# Patient Record
Sex: MALE | Race: OTHER | HISPANIC OR LATINO | Employment: FULL TIME | URBAN - METROPOLITAN AREA
[De-identification: names, ages, dates, MRNs, and addresses within clinical notes are randomized per-mention and may not be internally consistent; named-entity substitution may affect disease eponyms.]

---

## 2018-12-27 ENCOUNTER — APPOINTMENT (EMERGENCY)
Dept: RADIOLOGY | Facility: HOSPITAL | Age: 58
End: 2018-12-27
Payer: COMMERCIAL

## 2018-12-27 ENCOUNTER — HOSPITAL ENCOUNTER (EMERGENCY)
Facility: HOSPITAL | Age: 58
Discharge: HOME | End: 2018-12-27
Attending: EMERGENCY MEDICINE | Admitting: EMERGENCY MEDICINE
Payer: COMMERCIAL

## 2018-12-27 VITALS
TEMPERATURE: 97.6 F | SYSTOLIC BLOOD PRESSURE: 132 MMHG | WEIGHT: 220 LBS | RESPIRATION RATE: 24 BRPM | DIASTOLIC BLOOD PRESSURE: 76 MMHG | HEIGHT: 67 IN | HEART RATE: 74 BPM | OXYGEN SATURATION: 98 % | BODY MASS INDEX: 34.53 KG/M2

## 2018-12-27 DIAGNOSIS — R07.9 CHEST PAIN: Primary | ICD-10-CM

## 2018-12-27 DIAGNOSIS — R07.89 CHEST WALL PAIN: ICD-10-CM

## 2018-12-27 DIAGNOSIS — R06.00 DYSPNEA: ICD-10-CM

## 2018-12-27 LAB
ALBUMIN SERPL BCP-MCNC: 3.8 G/DL (ref 3.5–5)
ALP SERPL-CCNC: 55 U/L (ref 46–116)
ALT SERPL W P-5'-P-CCNC: 44 U/L (ref 12–78)
ANION GAP SERPL CALCULATED.3IONS-SCNC: 8 MMOL/L (ref 4–13)
AST SERPL W P-5'-P-CCNC: 24 U/L (ref 5–45)
BASOPHILS # BLD AUTO: 0.03 THOUSANDS/ΜL (ref 0–0.1)
BASOPHILS NFR BLD AUTO: 1 % (ref 0–1)
BILIRUB SERPL-MCNC: 0.3 MG/DL (ref 0.2–1)
BUN SERPL-MCNC: 16 MG/DL (ref 5–25)
CALCIUM SERPL-MCNC: 9.1 MG/DL (ref 8.3–10.1)
CHLORIDE SERPL-SCNC: 104 MMOL/L (ref 100–108)
CO2 SERPL-SCNC: 29 MMOL/L (ref 21–32)
CREAT SERPL-MCNC: 0.84 MG/DL (ref 0.6–1.3)
EOSINOPHIL # BLD AUTO: 0.36 THOUSAND/ΜL (ref 0–0.61)
EOSINOPHIL NFR BLD AUTO: 6 % (ref 0–6)
ERYTHROCYTE [DISTWIDTH] IN BLOOD BY AUTOMATED COUNT: 13.3 % (ref 11.6–15.1)
GFR SERPL CREATININE-BSD FRML MDRD: 95 ML/MIN/1.73SQ M
GLUCOSE SERPL-MCNC: 100 MG/DL (ref 65–140)
HCT VFR BLD AUTO: 42.2 % (ref 36.5–49.3)
HGB BLD-MCNC: 14 G/DL (ref 12–17)
IMM GRANULOCYTES # BLD AUTO: 0.01 THOUSAND/UL (ref 0–0.2)
IMM GRANULOCYTES NFR BLD AUTO: 0 % (ref 0–2)
LIPASE SERPL-CCNC: 85 U/L (ref 73–393)
LYMPHOCYTES # BLD AUTO: 1.25 THOUSANDS/ΜL (ref 0.6–4.47)
LYMPHOCYTES NFR BLD AUTO: 21 % (ref 14–44)
MCH RBC QN AUTO: 28.6 PG (ref 26.8–34.3)
MCHC RBC AUTO-ENTMCNC: 33.2 G/DL (ref 31.4–37.4)
MCV RBC AUTO: 86 FL (ref 82–98)
MONOCYTES # BLD AUTO: 0.56 THOUSAND/ΜL (ref 0.17–1.22)
MONOCYTES NFR BLD AUTO: 10 % (ref 4–12)
NEUTROPHILS # BLD AUTO: 3.64 THOUSANDS/ΜL (ref 1.85–7.62)
NEUTS SEG NFR BLD AUTO: 62 % (ref 43–75)
NRBC BLD AUTO-RTO: 0 /100 WBCS
PLATELET # BLD AUTO: 278 THOUSANDS/UL (ref 149–390)
PMV BLD AUTO: 9.3 FL (ref 8.9–12.7)
POTASSIUM SERPL-SCNC: 3.8 MMOL/L (ref 3.5–5.3)
PROT SERPL-MCNC: 7.4 G/DL (ref 6.4–8.2)
RBC # BLD AUTO: 4.9 MILLION/UL (ref 3.88–5.62)
SODIUM SERPL-SCNC: 141 MMOL/L (ref 136–145)
TROPONIN I SERPL-MCNC: <0.02 NG/ML
WBC # BLD AUTO: 5.85 THOUSAND/UL (ref 4.31–10.16)

## 2018-12-27 PROCEDURE — 99285 EMERGENCY DEPT VISIT HI MDM: CPT

## 2018-12-27 PROCEDURE — 93005 ELECTROCARDIOGRAM TRACING: CPT

## 2018-12-27 PROCEDURE — 80053 COMPREHEN METABOLIC PANEL: CPT | Performed by: EMERGENCY MEDICINE

## 2018-12-27 PROCEDURE — 83690 ASSAY OF LIPASE: CPT | Performed by: EMERGENCY MEDICINE

## 2018-12-27 PROCEDURE — 36415 COLL VENOUS BLD VENIPUNCTURE: CPT | Performed by: EMERGENCY MEDICINE

## 2018-12-27 PROCEDURE — 71046 X-RAY EXAM CHEST 2 VIEWS: CPT

## 2018-12-27 PROCEDURE — 85025 COMPLETE CBC W/AUTO DIFF WBC: CPT | Performed by: EMERGENCY MEDICINE

## 2018-12-27 PROCEDURE — 84484 ASSAY OF TROPONIN QUANT: CPT | Performed by: EMERGENCY MEDICINE

## 2018-12-28 LAB
ATRIAL RATE: 68 BPM
P AXIS: 51 DEGREES
PR INTERVAL: 144 MS
QRS AXIS: 63 DEGREES
QRSD INTERVAL: 98 MS
QT INTERVAL: 406 MS
QTC INTERVAL: 431 MS
T WAVE AXIS: 77 DEGREES
VENTRICULAR RATE: 68 BPM

## 2018-12-28 PROCEDURE — 93010 ELECTROCARDIOGRAM REPORT: CPT | Performed by: INTERNAL MEDICINE

## 2018-12-28 NOTE — ED PROVIDER NOTES
History  Chief Complaint   Patient presents with    Chest Pain     Pt presents with chest pain onset 3 days ago  States his left arm and right leg feel numb and sometimes it is hard for him to breathe     3 d constant sternal chest discomfort, not worsened by exertion, not positional, no associated diaphoresis but constant dyspnea  No fever or chills  No orthopnea  H/o similar sxs in past approx 6 months ago, seen by cards and had normal stress test at that time  No h/o CAD  No h/o VTE  No new leg pain  No h/o recent surgery  Not a smoker  Currently symptomatic  Pain worsened w movement of upper extremities  Occasionally radiates into back  No syncope or weakness  No epigastric pain, nausea or vomiting  No other concerns  None       Past Medical History:   Diagnosis Date    Disease of thyroid gland     Hyperlipidemia     Sleep apnea        Past Surgical History:   Procedure Laterality Date    HERNIA REPAIR         History reviewed  No pertinent family history  I have reviewed and agree with the history as documented  Social History   Substance Use Topics    Smoking status: Never Smoker    Smokeless tobacco: Never Used    Alcohol use Yes      Comment: social        Review of Systems   Respiratory: Positive for chest tightness and shortness of breath  Cardiovascular: Positive for leg swelling  All other systems reviewed and are negative  Physical Exam  Physical Exam   Constitutional: He is oriented to person, place, and time  He appears well-developed and well-nourished  No distress  HENT:   Head: Normocephalic and atraumatic  Eyes: Pupils are equal, round, and reactive to light  Conjunctivae and EOM are normal    Neck: Normal range of motion  Neck supple  No JVD present  Cardiovascular: Normal rate, regular rhythm, normal heart sounds and intact distal pulses  Exam reveals no gallop and no friction rub  No murmur heard    Pulmonary/Chest: Effort normal and breath sounds normal  No stridor  No respiratory distress  He has no wheezes  He has no rales  He exhibits tenderness  Abdominal: Soft  He exhibits no distension  There is no tenderness  Musculoskeletal: Normal range of motion  He exhibits no edema, tenderness or deformity  Neurological: He is alert and oriented to person, place, and time  No cranial nerve deficit or sensory deficit  He exhibits normal muscle tone  Coordination normal    Skin: Skin is warm and dry  Capillary refill takes less than 2 seconds  He is not diaphoretic  Nursing note and vitals reviewed        Vital Signs  ED Triage Vitals   Temperature Pulse Respirations Blood Pressure SpO2   12/27/18 1816 12/27/18 1813 12/27/18 1813 12/27/18 1813 12/27/18 1813   97 6 °F (36 4 °C) 77 18 150/70 98 %      Temp Source Heart Rate Source Patient Position - Orthostatic VS BP Location FiO2 (%)   12/27/18 1816 12/27/18 1813 12/27/18 1813 12/27/18 1813 --   Oral Monitor Sitting Right arm       Pain Score       --                  Vitals:    12/27/18 1813 12/27/18 2000   BP: 150/70 132/76   Pulse: 77 74   Patient Position - Orthostatic VS: Sitting        Visual Acuity      ED Medications  Medications - No data to display    Diagnostic Studies  Results Reviewed     Procedure Component Value Units Date/Time    Lipase [[id number]]  (Normal) Collected:  12/27/18 2000    Lab Status:  Final result Specimen:  Blood from Arm, Right Updated:  12/27/18 2039     Lipase 85 u/L     Troponin I [[id number]]  (Normal) Collected:  12/27/18 2000    Lab Status:  Final result Specimen:  Blood from Arm, Right Updated:  12/27/18 2028     Troponin I <0 02 ng/mL     Comprehensive metabolic panel [[id number]] Collected:  12/27/18 2000    Lab Status:  Final result Specimen:  Blood from Arm, Right Updated:  12/27/18 2027     Sodium 141 mmol/L      Potassium 3 8 mmol/L      Chloride 104 mmol/L      CO2 29 mmol/L      ANION GAP 8 mmol/L      BUN 16 mg/dL      Creatinine 0 84 mg/dL      Glucose 100 mg/dL Calcium 9 1 mg/dL      AST 24 U/L      ALT 44 U/L      Alkaline Phosphatase 55 U/L      Total Protein 7 4 g/dL      Albumin 3 8 g/dL      Total Bilirubin 0 30 mg/dL      eGFR 95 ml/min/1 73sq m     Narrative:         National Kid[employer] Education Program recommendations are as follows:  GFR calculation is accurate only with a steady state creatinine  Chronic Kidney disease less than 60 ml/min/1 73 sq  meters  Kidney failure less than 15 ml/min/1 73 sq  meters      CBC and differential [[id number]] Collected:  12/27/18 2000    Lab Status:  Final result Specimen:  Blood from Arm, Right Updated:  12/27/18 2008     WBC 5 85 Thousand/uL      RBC 4 90 Million/uL      Hemoglobin 14 0 g/dL      Hematocrit 42 2 %      MCV 86 fL      MCH 28 6 pg      MCHC 33 2 g/dL      RDW 13 3 %      MPV 9 3 fL      Platelets 997 Thousands/uL      nRBC 0 /100 WBCs      Neutrophils Relative 62 %      Immat GRANS % 0 %      Lymphocytes Relative 21 %      Monocytes Relative 10 %      Eosinophils Relative 6 %      Basophils Relative 1 %      Neutrophils Absolute 3 64 Thousands/µL      Immature Grans Absolute 0 01 Thousand/uL      Lymphocytes Absolute 1 25 Thousands/µL      Monocytes Absolute 0 56 Thousand/µL      Eosinophils Absolute 0 36 Thousand/µL      Basophils Absolute 0 03 Thousands/µL                  X-ray chest 2 views    (Results Pending)              Procedures  ECG 12 Lead Documentation  Date/Time: 12/27/2018 8:07 PM  Performed by: Sheryl Medina  Authorized by: Sanjana KAMINSKI     Indications / Diagnosis:  Cp  ECG reviewed by me, the ED Provider: yes    Patient location:  ED  Previous ECG:     Previous ECG:  Unavailable  Interpretation:     Interpretation: normal    Rate:     ECG rate:  68    ECG rate assessment: normal    Rhythm:     Rhythm: sinus rhythm    Comments:      Normal ekg             Phone Contacts  ED Phone Contact    ED Course                               MDM  Number of Diagnoses or Management Options  Chest wall pain:   Diagnosis management comments: 3d constant chest pain, worse w movement of UEs and w palpation of chest wall  Normal EKG and troponin  Normal stress test for same sxs 6 months ago  Overall low suspicion for ACS, also doubt PE, suspect chest wall pain and feel pt is safe and appropriate for d/c home, f/u pcp in next week or rted immediately if any new or worsening sxs  Family at bedside at time of discussion and agrees w plan to d/c home  Amount and/or Complexity of Data Reviewed  Clinical lab tests: ordered and reviewed  Tests in the radiology section of CPT®: ordered and reviewed  Decide to obtain previous medical records or to obtain history from someone other than the patient: yes  Obtain history from someone other than the patient: yes  Independent visualization of images, tracings, or specimens: yes      CritCare Time    Disposition  Final diagnoses:   Chest wall pain   Chest pain   Dyspnea     Time reflects when diagnosis was documented in both MDM as applicable and the Disposition within this note     Time User Action Codes Description Comment    12/27/2018  9:00 PM Janet [place] Add [R07 89] Chest wall pain     12/27/2018 10:45 PM Janet El Add [R07 9] Chest pain     12/27/2018 10:45 PM Janet El Add [R06 00] Dyspnea     12/27/2018 10:45 PM Janet El Modify [R07 89] Chest wall pain     12/27/2018 10:45 PM Janet [place] Modify [R07 9] Chest pain       ED Disposition     ED Disposition Condition Comment    Discharge  [place] discharge to home/self care  Condition at discharge: Stable        Follow-up Information     Follow up With Specialties Details Why Contact Info      In 1 week      [address] [place] [address]  [phone]            There are no discharge medications for this patient  No discharge procedures on file      ED Provider  Electronically Signed by           Alessandra Parisi MD  12/27/18 [id number]

## 2018-12-28 NOTE — DISCHARGE INSTRUCTIONS
